# Patient Record
Sex: FEMALE | Race: WHITE | NOT HISPANIC OR LATINO | Employment: UNEMPLOYED | URBAN - METROPOLITAN AREA
[De-identification: names, ages, dates, MRNs, and addresses within clinical notes are randomized per-mention and may not be internally consistent; named-entity substitution may affect disease eponyms.]

---

## 2017-04-24 RX ORDER — CLOBETASOL PROPIONATE 0.5 MG/G
CREAM TOPICAL
Qty: 30 G | Refills: 1 | Status: SHIPPED | OUTPATIENT
Start: 2017-04-24 | End: 2018-05-30 | Stop reason: SDUPTHER

## 2017-04-24 NOTE — TELEPHONE ENCOUNTER
----- Message from Uriel Gaston sent at 4/24/2017 11:25 AM CDT -----  Contact: self/[phone]  The patient is requesting an rx for clobetasol (TEMOVATE) 0.05 % cream sent to [facility] at [phone].

## 2017-05-31 ENCOUNTER — TELEPHONE (OUTPATIENT)
Dept: OBSTETRICS AND GYNECOLOGY | Facility: CLINIC | Age: 57
End: 2017-05-31

## 2017-05-31 NOTE — TELEPHONE ENCOUNTER
patient contacted and states she is not bleeding and will follow up with the office as needed. Verbalized understanding.

## 2017-05-31 NOTE — TELEPHONE ENCOUNTER
Pt was seen in oct with fibrods and irreg bleeding,  Plan was to fu with another us in spring,   Please call and see if any bleeding, set up vag us and appt if so in few weeks, if no bleeding at all, can wait, thanks

## 2018-05-30 DIAGNOSIS — N90.9 IRRITATION OF EXTERNAL FEMALE GENITALIA: Primary | ICD-10-CM

## 2018-05-30 NOTE — TELEPHONE ENCOUNTER
----- Message from Nuzhat Don sent at 5/30/2018 11:02 AM CDT -----  Contact: [phone]/ self   Pt requesting a refill on clobetasol (TEMOVATE) 0.05 % cream and antibiotics for a staff infection sent to [facility]'s  [phone] . Please advise

## 2018-05-31 RX ORDER — CLOBETASOL PROPIONATE 0.5 MG/G
CREAM TOPICAL
Qty: 30 G | Refills: 1 | Status: SHIPPED | OUTPATIENT
Start: 2018-05-31

## 2018-08-30 ENCOUNTER — TELEPHONE (OUTPATIENT)
Dept: INTERNAL MEDICINE | Facility: CLINIC | Age: 58
End: 2018-08-30

## 2018-08-30 NOTE — TELEPHONE ENCOUNTER
Spoke to Verito w/ [facility] regarding Ms Larkin appt, informed Verito Larkin is not our pt and never seen our clinic, Verito states she has an appt with Dr Lanny Carreon, informed Verito Ca is no longer w/ [facility] and she must have sent her message to a wrong PCP by mistake. She verbalized understanding.

## 2018-08-30 NOTE — TELEPHONE ENCOUNTER
----- Message from Anthony Braxton sent at 8/30/2018  2:36 PM CDT -----  Contact: Verito beyer/[facility]   [phone]  Verito is requesting a medical clearance be faxed to [phone]  Please advise